# Patient Record
Sex: MALE | Race: WHITE | NOT HISPANIC OR LATINO | Employment: OTHER | URBAN - METROPOLITAN AREA
[De-identification: names, ages, dates, MRNs, and addresses within clinical notes are randomized per-mention and may not be internally consistent; named-entity substitution may affect disease eponyms.]

---

## 2021-01-22 ENCOUNTER — OFFICE VISIT (OUTPATIENT)
Dept: MEDICAL GROUP | Age: 56
End: 2021-01-22

## 2021-01-22 VITALS
TEMPERATURE: 98.5 F | OXYGEN SATURATION: 95 % | HEART RATE: 60 BPM | RESPIRATION RATE: 16 BRPM | HEIGHT: 71 IN | DIASTOLIC BLOOD PRESSURE: 76 MMHG | SYSTOLIC BLOOD PRESSURE: 100 MMHG | WEIGHT: 219.6 LBS | BODY MASS INDEX: 30.74 KG/M2

## 2021-01-22 DIAGNOSIS — E78.00 PURE HYPERCHOLESTEROLEMIA: ICD-10-CM

## 2021-01-22 DIAGNOSIS — I10 ESSENTIAL HYPERTENSION: ICD-10-CM

## 2021-01-22 DIAGNOSIS — Z00.00 ANNUAL PHYSICAL EXAM: ICD-10-CM

## 2021-01-22 DIAGNOSIS — Z00.00 ENCOUNTER FOR MEDICAL EXAMINATION TO ESTABLISH CARE: ICD-10-CM

## 2021-01-22 DIAGNOSIS — E11.9 TYPE 2 DIABETES MELLITUS WITHOUT COMPLICATION, WITHOUT LONG-TERM CURRENT USE OF INSULIN (HCC): ICD-10-CM

## 2021-01-22 PROBLEM — Z91.199 NON-COMPLIANCE: Status: ACTIVE | Noted: 2020-11-01

## 2021-01-22 PROCEDURE — 99203 OFFICE O/P NEW LOW 30 MIN: CPT | Performed by: FAMILY MEDICINE

## 2021-01-22 RX ORDER — GLIMEPIRIDE 2 MG/1
TABLET ORAL
COMMUNITY
Start: 2020-11-12

## 2021-01-22 RX ORDER — LISINOPRIL 5 MG/1
5 TABLET ORAL DAILY
COMMUNITY
Start: 2020-09-01

## 2021-01-22 RX ORDER — INSULIN GLARGINE 100 [IU]/ML
INJECTION, SOLUTION SUBCUTANEOUS
COMMUNITY
Start: 2020-08-27

## 2021-01-22 RX ORDER — ATORVASTATIN CALCIUM 20 MG/1
TABLET, FILM COATED ORAL
COMMUNITY
Start: 2021-01-04

## 2021-01-22 SDOH — HEALTH STABILITY: MENTAL HEALTH: HOW OFTEN DO YOU HAVE A DRINK CONTAINING ALCOHOL?: 4 OR MORE TIMES A WEEK

## 2021-01-22 ASSESSMENT — PATIENT HEALTH QUESTIONNAIRE - PHQ9: CLINICAL INTERPRETATION OF PHQ2 SCORE: 0

## 2021-01-22 NOTE — PROGRESS NOTES
This medical record contains text that has been entered with the assistance of computer voice recognition and dictation software.  Therefore, it may contain unintended errors in text, spelling, punctuation, or grammar      Chief Complaint   Patient presents with   • Establish Care         Les Mary is a 55 y.o. male here evaluation and management of: [facility].      HPI:     1. Encounter for medical examination to establish care    The patient is a very pleasant 55-year-old male who presents to clinic to establish care.  He has a significant past medical history of type 2 diabetes mellitus, hypertension hyperlipidemia.    Today the patient denied any chest pain, shortness of breath, fevers, chills, night sweats, numbness and tingling, polyphagia polydipsia or unintentional weight loss, change in taste or smell.    Past medical history see above and below    Family History of Cancer---none    Family History of CAD---mother with MI 41 y/o      Social History        Occupation----construction    Exercise--- not regular.    Colonoscopy---states up to date normal last year    Pets--- 1 pitbull 10 years old named [employer] sports team--- none      2. Type 2 diabetes mellitus without complication, without long-term current use of insulin (HCC)  Metformin 1000 mg p.o. twice daily  Jardiance 25 mg p.o. daily  Glyburide 2 mg p.o. daily  Victoza 1.2 mg subcutaneous daily  Basaglar 20 units nightly    Patient states he has been compliant with his medication, he denies any polyphagia, no polydipsia no polyuria no unintentional weight loss.    3. Annual labs  For annual labs.    4. Essential hypertension  Lisinopril 10 mg p.o. daily    Home BP readings--- not monitoring    Today, the patient has been tollerating the BP meds without any issues. The patient denies any cough presyncopal episodes, syncopal episodes.      5. Pure hypercholesterolemia  Atorvastatin 20 mg p.o. nightly    The patient has been on a  "statin for years and tolerating this fine. The patient denies any muscle aches, no abdominal pain and no history of elevated liver enzymes.      Current medicines (including changes today)  Current Outpatient Medications   Medication Sig Dispense Refill   • atorvastatin (LIPITOR) 20 MG Tab TAKE ONE TABLET BY MOUTH EVERY DAY     • Empagliflozin 25 MG Tab TAKE ONE TABLET BY MOUTH EVERY MORNING     • glimepiride (AMARYL) 2 MG Tab Take 1 tablet (2 mg total) by mouth daily before breakfast.     • insulin glargine (BASAGLAR KWIKPEN) 100 UNIT/ML injection PEN Inject 20 Units under the skin one time daily.     • liraglutide (VICTOZA) 18 MG/3ML Solution Pen-injector Inject 0.2 mL (1.2 mg total) under the skin daily.     • lisinopril (PRINIVIL) 5 MG Tab Take 5 mg by mouth every day.     • metformin (GLUCOPHAGE) 1000 MG tablet TAKE 1 TABLET BY MOUTH TWICE DAILY WITH MEALS (WITH MORNING AND EVENING MEALS)       No current facility-administered medications for this visit.      He  has no past medical history on file.  He  has no past surgical history on file.  Social History     Tobacco Use   • Smoking status: Former Smoker   • Smokeless tobacco: Never Used   Substance Use Topics   • Alcohol use: Yes     Alcohol/week: 4.2 oz     Types: 7 Glasses of wine per week     Frequency: 4 or more times a week     Comment: glass of wine daily   • Drug use: Never     Social History     Social History Narrative   • Not on file     History reviewed. No pertinent family history.  No family status information on file.         ROS    The pertinent  ROS findings can be seen in the HPI above.     All other systems reviewed and are negative     Objective:     /76 (BP Location: Right arm, Patient Position: Sitting, BP Cuff Size: Large adult)   Pulse 60   Temp 36.9 °C (98.5 °F) (Temporal)   Resp 16   Ht 1.803 m (5' 11\")   Wt 99.6 kg (219 lb 9.6 oz)   SpO2 95%  Body mass index is 30.63 kg/m².      Physical Exam:    Constitutional: Alert, " no distress.  Skin: no rashes  Eye: Equal, round and reactive, conjunctiva clear, lids normal.  ENMT: Lips without lesions, good dentition, oropharynx clear.  Neck: Trachea midline, no masses, no thyromegaly. No cervical or supraclavicular lymphadenopathy.  Respiratory: Unlabored respiratory effort, lungs clear to auscultation, no wheezes, no ronchi.  Cardiovascular: Normal S1, S2, no murmur, no edema  Abdomen: Soft, non-tender, no masses, no hepatosplenomegaly.        Assessment and Plan:   The following treatment plan was discussed    1. Encounter for medical examination to establish care  Care has been established  We need  baseline labs to establish a clinical profile  We will then consider daily prophylactic aspirin   In order to weigh  the benefits vs risk of GI bleed    Age-appropriate preventive services recommended by USPTF guidelines and ACIP were discussed today.      The USPSTF recommends initiating low-dose aspirin use for the primary prevention of cardiovascular disease (CVD) and colorectal cancer (CRC) in adults aged 50 to 59 years who have a 10% or greater 10-year CVD risk, are not at increased risk for bleeding, have a life expectancy of at least 10 years, and are willing to take low-dose aspirin daily for at least 10 years.      Requested any outside Medical records to be sent to us  Denies intimate partner viloence  Discussed seat belt safety        2. Type 2 diabetes mellitus without complication, without long-term current use of insulin (HCC)    We will obtain new labs to update clinical profile.  Then we will adjust therapy as needed.    - MICROALBUMIN CREAT RATIO URINE; Future  - HEMOGLOBIN A1C; Future    3. Essential hypertension  Patient has been stable with current management  We will make no changes for now      4. Annual labs    - Comp Metabolic Panel; Future  - CBC WITHOUT DIFFERENTIAL; Future  - Lipid Profile; Future  - PROSTATE SPECIFIC AG DIAGNOSTIC; Future  - TSH+FREE T4  - T3 FREE;  Future  - VITAMIN D,25 HYDROXY; Future  - HEP C VIRUS ANTIBODY; Future    5. Pure hypercholesterolemia  Patient has been stable with current management  We will make no changes for now        Instructed to Follow up in clinic or ER for worsening symptoms, difficulty breathing, lack of expected recovery, or should new symptoms or problems arise.    Followup: Return in about 3 months (around 4/22/2021) for Reevaluation, labs.

## 2021-06-15 ENCOUNTER — TELEPHONE (OUTPATIENT)
Dept: MEDICAL GROUP | Age: 56
End: 2021-06-15

## 2021-06-15 NOTE — TELEPHONE ENCOUNTER
Phone Number Called: [phone] (home)       Call outcome: Did not leave a detailed message. Requested patient to call back.    Message: LVM for pt to schedule a follow up appt with Dr Finn. Pt needs to complete fasting labs (ordered) and schedule a lab review appt. Pt is overdue for this.

## 2021-06-17 NOTE — TELEPHONE ENCOUNTER
Phone Number Called: [phone]    Call outcome: Left detailed message for patient. Informed to call back with any additional questions.    Message: 2nd attempt

## 2022-10-02 DIAGNOSIS — Z20.822 EXPOSURE TO COVID-19 VIRUS: ICD-10-CM

## 2022-10-02 RX ORDER — CODEINE PHOSPHATE AND GUAIFENESIN 10; 100 MG/5ML; MG/5ML
5 SOLUTION ORAL 3 TIMES DAILY PRN
Qty: 105 ML | Refills: 0 | Status: SHIPPED | OUTPATIENT
Start: 2022-10-02 | End: 2022-10-09

## 2022-10-02 RX ORDER — PREDNISONE 20 MG/1
TABLET ORAL
Qty: 12 TABLET | Refills: 0 | Status: SHIPPED | OUTPATIENT
Start: 2022-10-02